# Patient Record
Sex: FEMALE | Race: WHITE | NOT HISPANIC OR LATINO | Employment: FULL TIME | URBAN - METROPOLITAN AREA
[De-identification: names, ages, dates, MRNs, and addresses within clinical notes are randomized per-mention and may not be internally consistent; named-entity substitution may affect disease eponyms.]

---

## 2019-06-11 ENCOUNTER — INITIAL PRENATAL (OUTPATIENT)
Dept: OBGYN CLINIC | Facility: CLINIC | Age: 24
End: 2019-06-11

## 2019-06-11 VITALS
DIASTOLIC BLOOD PRESSURE: 68 MMHG | HEART RATE: 88 BPM | HEIGHT: 64 IN | RESPIRATION RATE: 16 BRPM | WEIGHT: 124.2 LBS | SYSTOLIC BLOOD PRESSURE: 90 MMHG | BODY MASS INDEX: 21.21 KG/M2

## 2019-06-11 DIAGNOSIS — Z34.01 ENCOUNTER FOR SUPERVISION OF NORMAL FIRST PREGNANCY IN FIRST TRIMESTER: Primary | ICD-10-CM

## 2019-06-11 DIAGNOSIS — Z3A.01 LESS THAN 8 WEEKS GESTATION OF PREGNANCY: ICD-10-CM

## 2019-06-11 PROCEDURE — NOBC: Performed by: OBSTETRICS & GYNECOLOGY

## 2019-06-11 RX ORDER — GUAIFENESIN/DEXTROMETHORPHAN 100-10MG/5
5 SYRUP ORAL 3 TIMES DAILY PRN
COMMUNITY
End: 2019-07-10

## 2019-06-12 ENCOUNTER — TELEPHONE (OUTPATIENT)
Dept: OBGYN CLINIC | Facility: CLINIC | Age: 24
End: 2019-06-12

## 2019-06-18 ENCOUNTER — HOSPITAL ENCOUNTER (OUTPATIENT)
Dept: ULTRASOUND IMAGING | Facility: HOSPITAL | Age: 24
Discharge: HOME | End: 2019-06-18
Attending: OBSTETRICS & GYNECOLOGY
Payer: COMMERCIAL

## 2019-06-18 DIAGNOSIS — Z3A.01 LESS THAN 8 WEEKS GESTATION OF PREGNANCY: ICD-10-CM

## 2019-06-18 DIAGNOSIS — Z34.01 ENCOUNTER FOR SUPERVISION OF NORMAL FIRST PREGNANCY IN FIRST TRIMESTER: ICD-10-CM

## 2019-06-18 PROCEDURE — 76801 OB US < 14 WKS SINGLE FETUS: CPT

## 2019-06-24 ENCOUNTER — TELEPHONE (OUTPATIENT)
Dept: OBGYN CLINIC | Facility: CLINIC | Age: 24
End: 2019-06-24

## 2019-06-25 DIAGNOSIS — Z3A.01 LESS THAN 8 WEEKS GESTATION OF PREGNANCY: Primary | ICD-10-CM

## 2019-07-02 LAB
ABO GROUP BLD: NORMAL
BASOPHILS # BLD AUTO: 0 X10E3/UL (ref 0–0.2)
BASOPHILS NFR BLD AUTO: 0 %
BLD GP AB SCN SERPL QL: NEGATIVE
EOSINOPHIL # BLD AUTO: 0.2 X10E3/UL (ref 0–0.4)
EOSINOPHIL NFR BLD AUTO: 4 %
ERYTHROCYTE [DISTWIDTH] IN BLOOD BY AUTOMATED COUNT: 13.3 % (ref 12.3–15.4)
HBV SURFACE AG SERPL QL IA: NEGATIVE
HCT VFR BLD AUTO: 37.4 % (ref 34–46.6)
HGB BLD-MCNC: 13.1 G/DL (ref 11.1–15.9)
HIV 1+2 AB+HIV1 P24 AG SERPL QL IA: NON REACTIVE
IMM GRANULOCYTES # BLD: 0 X10E3/UL (ref 0–0.1)
IMM GRANULOCYTES NFR BLD: 0 %
LYMPHOCYTES # BLD AUTO: 1.5 X10E3/UL (ref 0.7–3.1)
LYMPHOCYTES NFR BLD AUTO: 28 %
MCH RBC QN AUTO: 28.7 PG (ref 26.6–33)
MCHC RBC AUTO-ENTMCNC: 35 G/DL (ref 31.5–35.7)
MCV RBC AUTO: 82 FL (ref 79–97)
MONOCYTES # BLD AUTO: 0.2 X10E3/UL (ref 0.1–0.9)
MONOCYTES NFR BLD AUTO: 5 %
NEUTROPHILS # BLD AUTO: 3.3 X10E3/UL (ref 1.4–7)
NEUTROPHILS NFR BLD AUTO: 63 %
PLATELET # BLD AUTO: 275 X10E3/UL (ref 150–450)
RBC # BLD AUTO: 4.57 X10E6/UL (ref 3.77–5.28)
RH BLD: NEGATIVE
RPR SER QL: NON REACTIVE
RUBV IGG SERPL IA-ACNC: 1 INDEX
WBC # BLD AUTO: 5.2 X10E3/UL (ref 3.4–10.8)

## 2019-07-10 ENCOUNTER — ROUTINE PRENATAL (OUTPATIENT)
Dept: OBGYN CLINIC | Facility: CLINIC | Age: 24
End: 2019-07-10

## 2019-07-10 VITALS — BODY MASS INDEX: 22.21 KG/M2 | WEIGHT: 127.4 LBS | DIASTOLIC BLOOD PRESSURE: 64 MMHG | SYSTOLIC BLOOD PRESSURE: 98 MMHG

## 2019-07-10 DIAGNOSIS — Z3A.09 9 WEEKS GESTATION OF PREGNANCY: ICD-10-CM

## 2019-07-10 DIAGNOSIS — Z34.81 ENCOUNTER FOR SUPERVISION OF OTHER NORMAL PREGNANCY IN FIRST TRIMESTER: ICD-10-CM

## 2019-07-10 DIAGNOSIS — Z34.01 ENCOUNTER FOR SUPERVISION OF NORMAL FIRST PREGNANCY IN FIRST TRIMESTER: Primary | ICD-10-CM

## 2019-07-10 DIAGNOSIS — Z11.3 SCREENING FOR STD (SEXUALLY TRANSMITTED DISEASE): ICD-10-CM

## 2019-07-10 PROCEDURE — 87591 N.GONORRHOEAE DNA AMP PROB: CPT | Performed by: OBSTETRICS & GYNECOLOGY

## 2019-07-10 PROCEDURE — PNV: Performed by: OBSTETRICS & GYNECOLOGY

## 2019-07-10 PROCEDURE — G0145 SCR C/V CYTO,THINLAYER,RESCR: HCPCS | Performed by: OBSTETRICS & GYNECOLOGY

## 2019-07-10 PROCEDURE — 87491 CHLMYD TRACH DNA AMP PROBE: CPT | Performed by: OBSTETRICS & GYNECOLOGY

## 2019-07-10 NOTE — PROGRESS NOTES
20 y o    female at [facility] [address] for PNV  BP : 95/64  TWlbs6 4oz  No cramping or bleeding  No nausea  Not sure if they are doing sequential screen yet, they don't understand if it's covered or not  They will call insurance company again  Plans on breast feeding  Pap, GC and chlamydia collected  Slight bleeding after Pap smear  Bedside transabdominal ultrasound confirms fetal movement and fetal cardiac activity  Patient will be moving to [facility] after she delivers

## 2019-07-15 LAB
C TRACH DNA SPEC QL NAA+PROBE: NEGATIVE
LAB AP GYN PRIMARY INTERPRETATION: NORMAL
Lab: NORMAL
N GONORRHOEA DNA SPEC QL NAA+PROBE: NEGATIVE

## 2019-08-08 ENCOUNTER — ROUTINE PRENATAL (OUTPATIENT)
Dept: OBGYN CLINIC | Facility: CLINIC | Age: 24
End: 2019-08-08

## 2019-08-08 VITALS — WEIGHT: 127.2 LBS | SYSTOLIC BLOOD PRESSURE: 110 MMHG | BODY MASS INDEX: 22.18 KG/M2 | DIASTOLIC BLOOD PRESSURE: 62 MMHG

## 2019-08-08 DIAGNOSIS — Z34.02 ENCOUNTER FOR SUPERVISION OF NORMAL FIRST PREGNANCY IN SECOND TRIMESTER: ICD-10-CM

## 2019-08-08 DIAGNOSIS — Z3A.13 13 WEEKS GESTATION OF PREGNANCY: Primary | ICD-10-CM

## 2019-08-08 PROCEDURE — PNV: Performed by: OBSTETRICS & GYNECOLOGY

## 2019-08-08 NOTE — PROGRESS NOTES
This is a 20 y o   at 13w3d who presents for return OB visit  No complaints  BP: 110/62 TWlb    Decided against genetic testing  Level 2 ordered    F/up 4 wks

## 2019-09-12 ENCOUNTER — ROUTINE PRENATAL (OUTPATIENT)
Dept: OBGYN CLINIC | Facility: CLINIC | Age: 24
End: 2019-09-12
Payer: COMMERCIAL

## 2019-09-12 VITALS — SYSTOLIC BLOOD PRESSURE: 110 MMHG | BODY MASS INDEX: 23.43 KG/M2 | DIASTOLIC BLOOD PRESSURE: 64 MMHG | WEIGHT: 134.4 LBS

## 2019-09-12 DIAGNOSIS — Z34.02 ENCOUNTER FOR SUPERVISION OF NORMAL FIRST PREGNANCY IN SECOND TRIMESTER: Primary | ICD-10-CM

## 2019-09-12 DIAGNOSIS — Z3A.18 18 WEEKS GESTATION OF PREGNANCY: ICD-10-CM

## 2019-09-12 DIAGNOSIS — Z23 NEED FOR INFLUENZA VACCINATION: ICD-10-CM

## 2019-09-12 PROCEDURE — 90686 IIV4 VACC NO PRSV 0.5 ML IM: CPT

## 2019-09-12 PROCEDURE — PNV: Performed by: OBSTETRICS & GYNECOLOGY

## 2019-09-12 PROCEDURE — 90471 IMMUNIZATION ADMIN: CPT

## 2019-09-12 NOTE — PROGRESS NOTES
Pt has no problems  Urine: glucose and protein negative  Flu shot given in left deltoid   Pt tolerated well

## 2019-09-16 PROBLEM — Z3A.18 18 WEEKS GESTATION OF PREGNANCY: Status: ACTIVE | Noted: 2019-07-10

## 2019-09-17 NOTE — PROGRESS NOTES
Patient is a 22yo  at 18 3wks EGA here for routine prenatal visit  BP: 110/64  TWG 19lb    Patient is doing well and has minimal complaints  Level II US scheduled for 19  Flu shot administered today  Pt to follow up in 4 weeks

## 2019-09-24 ENCOUNTER — ROUTINE PRENATAL (OUTPATIENT)
Dept: PERINATAL CARE | Facility: CLINIC | Age: 24
End: 2019-09-24
Payer: COMMERCIAL

## 2019-09-24 VITALS
SYSTOLIC BLOOD PRESSURE: 129 MMHG | WEIGHT: 136 LBS | DIASTOLIC BLOOD PRESSURE: 84 MMHG | HEIGHT: 64 IN | BODY MASS INDEX: 23.22 KG/M2 | HEART RATE: 120 BPM

## 2019-09-24 DIAGNOSIS — Z3A.20 20 WEEKS GESTATION OF PREGNANCY: ICD-10-CM

## 2019-09-24 DIAGNOSIS — O35.2XX0 HEREDITARY DISEASE IN FAMILY POSSIBLY AFFECTING FETUS, AFFECTING MANAGEMENT OF MOTHER IN PREGNANCY, SINGLE OR UNSPECIFIED FETUS: Primary | ICD-10-CM

## 2019-09-24 DIAGNOSIS — Z36.86 ENCOUNTER FOR ANTENATAL SCREENING FOR CERVICAL LENGTH: ICD-10-CM

## 2019-09-24 PROCEDURE — 76811 OB US DETAILED SNGL FETUS: CPT | Performed by: OBSTETRICS & GYNECOLOGY

## 2019-09-24 PROCEDURE — 76817 TRANSVAGINAL US OBSTETRIC: CPT | Performed by: OBSTETRICS & GYNECOLOGY

## 2019-09-24 PROCEDURE — 99241 PR OFFICE CONSULTATION NEW/ESTAB PATIENT 15 MIN: CPT | Performed by: OBSTETRICS & GYNECOLOGY

## 2019-09-24 PROCEDURE — 76805 OB US >/= 14 WKS SNGL FETUS: CPT | Performed by: OBSTETRICS & GYNECOLOGY

## 2019-09-24 NOTE — PROGRESS NOTES
CONSULT NOTE    Seferino Goltz, DO [address]  [address], [address] [facility]     Thank you for referring your Daly Edwards for a Maternal-Fetal Medicine Consultation:  Below is my consultation  Thank you very much for requesting a consultation on this very nice patient for the indication of a fetal anatomic evaluation  This is the patient's 1st pregnancy  The patient has no significant medical or surgical history  She denies the current use of tobacco, alcohol, or drugs  She currently takes prenatal vitamins and has no known drug allergies  Patient's family medical history is extensive including 3 of her siblings with congenital heart defect  One sibling has hypoplastic left heart syndrome  The patient states that her family otherwise does not have any significant history  She has been evaluated and does not have any congenital heart defect  On exam today Tiesha Valle appears well, in no acute distress, and denies any complaints  Her abdomen is non-tender  The patient has declined genetic screening, and we discussed that a normal ultrasound does not exclude all congenital birth defects or karyotypic abnormalities  The fetal anatomic survey is complete  There is no sonographic evidence of fetal abnormalities at this time  Good fetal movement and tone are seen  The amniotic fluid volume appears normal    A transvaginal ultrasound was performed to assess the cervix, which was not seen well transabdominally  The cervical length was 5 29 centimeters, which is normal for the current gestational age  There was no significant funneling or dynamic changes appreciated  The patient was informed of today's findings and all of her questions were answered  The limitations of ultrasound were reviewed          We discussed indications for fetal echocardiography and is recommended that 1st degree relative of the fetus with a history of congenital heart defect, undergo fetal echocardiogram  Excellent views of heart were appreciated today and no significant abnormalities were appreciated or suspected  We discussed the limitations ultrasound in general     We discussed follow-up in detail, and I recommend Barbara Cheek return as clinically indicated  Please note, in addition to the time spent discussing the results of the ultrasound, I spent approximately 15 minutes of face-to-face time with the patient, greater than 50% of which was spent in counseling and the coordination of care for this patient  Thank you very much for allowing us to participate in the care of this very nice patient  Should you have any questions, please do not hesitate to contact our office  Mike Bowen MD  Attending Physician, [place]

## 2019-09-24 NOTE — LETTER
September 24, 2019     Rachid MartelDO  [address]  [facility] [address] [facility]    Patient: Sandra Simpson   YOB: 1995   Date of Visit: 9/24/2019       Dear Dr Marnie Jolley:    Thank you for referring Edwar Griggs to me for evaluation  Below are my notes for this consultation  If you have questions, please do not hesitate to call me  I look forward to following your patient along with you  Sincerely,        Leesa Howell MD        CC: No Recipients  Leesa Howell MD  9/24/2019 12:19 PM  Sign at close encounter  [address] [place] [address]     Thank you for referring your Sandra Simpson for a Maternal-Fetal Medicine Consultation:  Below is my consultation  Thank you very much for requesting a consultation on this very nice patient for the indication of a fetal anatomic evaluation  This is the patient's 1st pregnancy  The patient has no significant medical or surgical history  She denies the current use of tobacco, alcohol, or drugs  She currently takes prenatal vitamins and has no known drug allergies  Patient's family medical history is extensive including 3 of her siblings with congenital heart defect  One sibling has hypoplastic left heart syndrome  The patient states that her family otherwise does not have any significant history  She has been evaluated and does not have any congenital heart defect  On exam today Barbara Cheek appears well, in no acute distress, and denies any complaints  Her abdomen is non-tender  The patient has declined genetic screening, and we discussed that a normal ultrasound does not exclude all congenital birth defects or karyotypic abnormalities  The fetal anatomic survey is complete  There is no sonographic evidence of fetal abnormalities at this time  Good fetal movement and tone are seen    The amniotic fluid volume appears normal    A transvaginal ultrasound was performed to assess the cervix, which was not seen well transabdominally  The cervical length was 5 29 centimeters, which is normal for the current gestational age  There was no significant funneling or dynamic changes appreciated  The patient was informed of today's findings and all of her questions were answered  The limitations of ultrasound were reviewed  We discussed indications for fetal echocardiography and is recommended that 1st degree relative of the fetus with a history of congenital heart defect, undergo fetal echocardiogram   Excellent views of heart were appreciated today and no significant abnormalities were appreciated or suspected  We discussed the limitations ultrasound in general     We discussed follow-up in detail, and I recommend Hugo Due return as clinically indicated  Please note, in addition to the time spent discussing the results of the ultrasound, I spent approximately 15 minutes of face-to-face time with the patient, greater than 50% of which was spent in counseling and the coordination of care for this patient  Thank you very much for allowing us to participate in the care of this very nice patient  Should you have any questions, please do not hesitate to contact our office  Mike Gates MD  Attending Physician, [place]

## 2019-09-24 NOTE — PROGRESS NOTES
A transvaginal ultrasound was performed  Sonographer note on use of High Level Disinfection Process (Trophon) for transvaginal probe# 1  used, serial # [id number]     Concepcion Garza RDMS

## 2019-10-09 ENCOUNTER — ROUTINE PRENATAL (OUTPATIENT)
Dept: OBGYN CLINIC | Facility: CLINIC | Age: 24
End: 2019-10-09

## 2019-10-09 VITALS — DIASTOLIC BLOOD PRESSURE: 66 MMHG | WEIGHT: 137 LBS | SYSTOLIC BLOOD PRESSURE: 110 MMHG | BODY MASS INDEX: 23.52 KG/M2

## 2019-10-09 DIAGNOSIS — Z34.02 ENCOUNTER FOR SUPERVISION OF NORMAL FIRST PREGNANCY IN SECOND TRIMESTER: Primary | ICD-10-CM

## 2019-10-09 DIAGNOSIS — Z3A.22 22 WEEKS GESTATION OF PREGNANCY: ICD-10-CM

## 2019-10-09 PROCEDURE — PNV: Performed by: OBSTETRICS & GYNECOLOGY

## 2019-10-09 NOTE — PROGRESS NOTES
20 y o    female at 19 4 wga EGA for PNV  BP : 110/66  TWlb    Patient doing well  No complaints  No contractions, vaginal bleeding, or LOF  Reports feeling fetal movement now  [address]  [facility] US reviewed  No fetal anomalies  - discussed general recommendations for fetal echo with strong family history of congenital heart disease     - excellent views of fetal heart obtained  No signs of abnormalities  Patient has chosen to not do fetal echo  Patient moving to [facility]   Recommended to call different providers and request for our prenatal records as soon as she can  - will see patient in early November at 26 weeks  Encouraged to do 28 week labs before moving and breast pump rx can be provided at that time     -will call insurance about breast pump coverage  Provide slip at next appt  Follow up in 4 weeks

## 2019-11-06 ENCOUNTER — ROUTINE PRENATAL (OUTPATIENT)
Dept: OBGYN CLINIC | Facility: CLINIC | Age: 24
End: 2019-11-06

## 2019-11-06 VITALS — WEIGHT: 143.6 LBS | SYSTOLIC BLOOD PRESSURE: 102 MMHG | BODY MASS INDEX: 24.65 KG/M2 | DIASTOLIC BLOOD PRESSURE: 64 MMHG

## 2019-11-06 DIAGNOSIS — Z34.02 ENCOUNTER FOR SUPERVISION OF NORMAL FIRST PREGNANCY IN SECOND TRIMESTER: Primary | ICD-10-CM

## 2019-11-06 DIAGNOSIS — Z67.91 RH NEGATIVE STATUS DURING PREGNANCY IN SECOND TRIMESTER: ICD-10-CM

## 2019-11-06 DIAGNOSIS — Z3A.26 26 WEEKS GESTATION OF PREGNANCY: ICD-10-CM

## 2019-11-06 DIAGNOSIS — O26.892 RH NEGATIVE STATUS DURING PREGNANCY IN SECOND TRIMESTER: ICD-10-CM

## 2019-11-06 PROCEDURE — PNV: Performed by: OBSTETRICS & GYNECOLOGY

## 2019-11-06 NOTE — PROGRESS NOTES
20 y o    female at 29 4 wga EGA for PNV  BP : 102/64  TWlb    Patient doing well and has no complaints today  She is deciding between 2 OB/Gyn offices down in [facility] to continue her prenatal care  Move is set for  week labs provided to patient  She will plan to have them done next week to give us some time to do a 3hr GTT in case her glucola is elevated  Last appointment scheduled for

## 2019-11-12 LAB
ABO GROUP BLD: NORMAL
BASOPHILS # BLD AUTO: 0 X10E3/UL (ref 0–0.2)
BASOPHILS NFR BLD AUTO: 0 %
BLD GP AB SCN SERPL QL: NEGATIVE
EOSINOPHIL # BLD AUTO: 0.1 X10E3/UL (ref 0–0.4)
EOSINOPHIL NFR BLD AUTO: 2 %
ERYTHROCYTE [DISTWIDTH] IN BLOOD BY AUTOMATED COUNT: 12.8 % (ref 12.3–15.4)
GLUCOSE 1H P 50 G GLC PO SERPL-MCNC: 139 MG/DL (ref 65–139)
HCT VFR BLD AUTO: 33.7 % (ref 34–46.6)
HGB BLD-MCNC: 11.3 G/DL (ref 11.1–15.9)
IMM GRANULOCYTES # BLD: 0.1 X10E3/UL (ref 0–0.1)
IMM GRANULOCYTES NFR BLD: 1 %
LYMPHOCYTES # BLD AUTO: 1.7 X10E3/UL (ref 0.7–3.1)
LYMPHOCYTES NFR BLD AUTO: 22 %
MCH RBC QN AUTO: 28.1 PG (ref 26.6–33)
MCHC RBC AUTO-ENTMCNC: 33.5 G/DL (ref 31.5–35.7)
MCV RBC AUTO: 84 FL (ref 79–97)
MONOCYTES # BLD AUTO: 0.5 X10E3/UL (ref 0.1–0.9)
MONOCYTES NFR BLD AUTO: 6 %
NEUTROPHILS # BLD AUTO: 5.4 X10E3/UL (ref 1.4–7)
NEUTROPHILS NFR BLD AUTO: 69 %
PLATELET # BLD AUTO: 257 X10E3/UL (ref 150–450)
RBC # BLD AUTO: 4.02 X10E6/UL (ref 3.77–5.28)
RH BLD: NEGATIVE
RPR SER QL: NON REACTIVE
WBC # BLD AUTO: 7.8 X10E3/UL (ref 3.4–10.8)

## 2019-11-18 ENCOUNTER — ROUTINE PRENATAL (OUTPATIENT)
Dept: OBGYN CLINIC | Facility: CLINIC | Age: 24
End: 2019-11-18
Payer: COMMERCIAL

## 2019-11-18 VITALS — SYSTOLIC BLOOD PRESSURE: 104 MMHG | BODY MASS INDEX: 24.55 KG/M2 | DIASTOLIC BLOOD PRESSURE: 64 MMHG | WEIGHT: 143 LBS

## 2019-11-18 DIAGNOSIS — Z29.13 NEED FOR RHOGAM DUE TO RH NEGATIVE MOTHER: ICD-10-CM

## 2019-11-18 DIAGNOSIS — Z23 NEED FOR TDAP VACCINATION: ICD-10-CM

## 2019-11-18 DIAGNOSIS — Z3A.28 28 WEEKS GESTATION OF PREGNANCY: ICD-10-CM

## 2019-11-18 DIAGNOSIS — R73.09 ABNORMAL GLUCOSE TOLERANCE TEST (GTT): Primary | ICD-10-CM

## 2019-11-18 PROCEDURE — 90384 RH IG FULL-DOSE IM: CPT | Performed by: OBSTETRICS & GYNECOLOGY

## 2019-11-18 PROCEDURE — 90471 IMMUNIZATION ADMIN: CPT | Performed by: OBSTETRICS & GYNECOLOGY

## 2019-11-18 PROCEDURE — 96372 THER/PROPH/DIAG INJ SC/IM: CPT | Performed by: OBSTETRICS & GYNECOLOGY

## 2019-11-18 PROCEDURE — 90715 TDAP VACCINE 7 YRS/> IM: CPT | Performed by: OBSTETRICS & GYNECOLOGY

## 2019-11-18 PROCEDURE — PNV: Performed by: OBSTETRICS & GYNECOLOGY

## 2019-11-18 NOTE — PROGRESS NOTES
Red folder/breast pump due   Rhogam needed today-wants to discuss before getting it   C/o sciatic nerve pain on left side   C/o cramping that comes/goes on lower right pelvic area   Urine dip neg/neg

## 2019-11-18 NOTE — RESULT ENCOUNTER NOTE
28 wk labs reviewed with patient at most recent appointment  Glucola elevated at 139, 3hr GTT ordered for patient  Hgb 11 3, plts 257

## 2019-11-18 NOTE — PROGRESS NOTES
20 y o    female at 31 0 wga EGA for PNV  BP : 104/64  TWlb    Patient is doing well  Getting ready for the move to [facility] at the end of the week  She has 2 OB/Gyn practices and will sign her medical release form to them today  28 wk labs reviewed  Hgb 11 3, plts 257  Glucola 139     - discussed elevated glucola result and recommendation to have 3 hr GTT performed  Pt will try to have done before moving this week  Baby & Me booklet given and reviewed  Patient plans to breastfeed  Paperwork filled out for breast pump  Skin to skin, rooming in, delayed cord clamp,  pain management in labor discussed  TDAP given  Rhogam indicated and administered  Fetal kick counts reviewed  Patient denies contractions, bleeding or leakage of fluid  Good fetal movement  No follow up scheduled as she will be moving out of [facility]

## 2019-11-21 LAB
GLUCOSE 1H P 100 G GLC PO SERPL-MCNC: 104 MG/DL (ref 65–179)
GLUCOSE 2H P 100 G GLC PO SERPL-MCNC: 95 MG/DL (ref 65–154)
GLUCOSE 3H P 100 G GLC PO SERPL-MCNC: 82 MG/DL (ref 65–139)
GLUCOSE P FAST SERPL-MCNC: 73 MG/DL (ref 65–94)
SL AMB NOTE:: NORMAL